# Patient Record
Sex: FEMALE | Race: AMERICAN INDIAN OR ALASKA NATIVE
[De-identification: names, ages, dates, MRNs, and addresses within clinical notes are randomized per-mention and may not be internally consistent; named-entity substitution may affect disease eponyms.]

---

## 2018-10-10 ENCOUNTER — HOSPITAL ENCOUNTER (EMERGENCY)
Dept: HOSPITAL 5 - ED | Age: 53
LOS: 1 days | Discharge: HOME | End: 2018-10-11
Payer: COMMERCIAL

## 2018-10-10 DIAGNOSIS — I10: ICD-10-CM

## 2018-10-10 DIAGNOSIS — R10.84: Primary | ICD-10-CM

## 2018-10-10 LAB
ALBUMIN SERPL-MCNC: 4.4 G/DL (ref 3.9–5)
ALT SERPL-CCNC: 16 UNITS/L (ref 7–56)
ANISOCYTOSIS BLD QL SMEAR: (no result)
BAND NEUTROPHILS # (MANUAL): 0.1 K/MM3
BILIRUB UR QL STRIP: (no result)
BLOOD UR QL VISUAL: (no result)
BUN SERPL-MCNC: 13 MG/DL (ref 7–17)
BUN/CREAT SERPL: 19 %
CALCIUM SERPL-MCNC: 9.1 MG/DL (ref 8.4–10.2)
HCT VFR BLD CALC: 30.3 % (ref 30.3–42.9)
HEMOLYSIS INDEX: 4
HGB BLD-MCNC: 9.5 GM/DL (ref 10.1–14.3)
MCH RBC QN AUTO: 19 PG (ref 28–32)
MCHC RBC AUTO-ENTMCNC: 31 % (ref 30–34)
MCV RBC AUTO: 60 FL (ref 79–97)
MYELOCYTES # (MANUAL): 0 K/MM3
PH UR STRIP: 8 [PH] (ref 5–7)
PLATELET # BLD: 226 K/MM3 (ref 140–440)
PROMYELOCYTES # (MANUAL): 0 K/MM3
PROT UR STRIP-MCNC: (no result) MG/DL
RBC # BLD AUTO: 5.04 M/MM3 (ref 3.65–5.03)
RBC #/AREA URNS HPF: < 1 /HPF (ref 0–6)
TOTAL CELLS COUNTED BLD: 100
UROBILINOGEN UR-MCNC: < 2 MG/DL (ref ?–2)
WBC #/AREA URNS HPF: 1 /HPF (ref 0–6)

## 2018-10-10 PROCEDURE — 81001 URINALYSIS AUTO W/SCOPE: CPT

## 2018-10-10 PROCEDURE — 36415 COLL VENOUS BLD VENIPUNCTURE: CPT

## 2018-10-10 PROCEDURE — 74177 CT ABD & PELVIS W/CONTRAST: CPT

## 2018-10-10 PROCEDURE — 85025 COMPLETE CBC W/AUTO DIFF WBC: CPT

## 2018-10-10 PROCEDURE — 85007 BL SMEAR W/DIFF WBC COUNT: CPT

## 2018-10-10 PROCEDURE — 74019 RADEX ABDOMEN 2 VIEWS: CPT

## 2018-10-10 PROCEDURE — 96374 THER/PROPH/DIAG INJ IV PUSH: CPT

## 2018-10-10 PROCEDURE — 83690 ASSAY OF LIPASE: CPT

## 2018-10-10 PROCEDURE — 99284 EMERGENCY DEPT VISIT MOD MDM: CPT

## 2018-10-10 PROCEDURE — 80053 COMPREHEN METABOLIC PANEL: CPT

## 2018-10-10 NOTE — XRAY REPORT
FINAL REPORT



PROCEDURE:  XR ABDOMEN 2V



TECHNIQUE:  Abdominal series, including supine and upright AP

views. 



HISTORY:  Abd pain 







COMPARISON:  No prior studies are available for comparison.



FINDINGS:  

Bowel gas pattern:Nonobstructive  .



Masses or calcifications:None  .



Bony structures:No significant abnormality  .



Pneumoperitoneum:None .



Other:No significant findings .



IMPRESSION:  

No acute abnormality.

## 2018-10-10 NOTE — CAT SCAN REPORT
FINAL REPORT



PROCEDURE:  CT ABDOMEN PELVIS W CON



TECHNIQUE:  Computerized axial tomography of the abdomen and

pelvis was performed after the IV injection of iodinated nonionic

contrast. 



HISTORY:  abd pain 



COMPARISON:  No prior studies are available for comparison.



FINDINGS:  

Visualized lower thorax: No significant abnormality.



Liver: Normal size and attenuation.



Spleen: Normal size and attenuation.



Gallbladder and biliary system: Normal.



Pancreas: Normal.



Adrenals: Normal.



Kidneys: Both kidneys have normal size. No hydronephrosis. No

renal stones or masses.



GI tract: Stomach is normal. The small bowel has a normal caliber

without obstruction. No ileus or enteritis. The cecum, appendix

region and colon are normal..



Lymph nodes and mesentery: Normal. 



Vasculature: Normal.



Bladder: Normal.



Reproductive organs: Normal.



Peritoneum: No free fluid.



Musculoskeletal structures: No significant abnormality.



Other: None.  



IMPRESSION:  

There is no evidence of intestinal or urinary tract obstruction.

No ileus or enteritis.
General

## 2018-10-11 VITALS — SYSTOLIC BLOOD PRESSURE: 165 MMHG | DIASTOLIC BLOOD PRESSURE: 84 MMHG

## 2020-11-17 ENCOUNTER — HOSPITAL ENCOUNTER (OUTPATIENT)
Dept: HOSPITAL 5 - MAMMO | Age: 55
Discharge: HOME | End: 2020-11-17
Attending: NURSE PRACTITIONER
Payer: COMMERCIAL

## 2020-11-17 DIAGNOSIS — Z12.31: Primary | ICD-10-CM

## 2020-11-17 PROCEDURE — 77067 SCR MAMMO BI INCL CAD: CPT

## 2020-11-17 NOTE — MAMMOGRAPHY REPORT
DIGITAL SCREENING MAMMOGRAM WITH CAD, 11/17/2020



INDICATION: Routine screening mammography. 



TECHNIQUE:  Digital bilateral  2D mammography was obtained in the craniocaudal and mediolateral obliq
ue projections. This examination was interpreted with the benefit of Computer-Aided Detection analysi
s.



COMPARISON: None. This is the patient's first mammogram.



FINDINGS: 



Breast Density: There are scattered areas of fibroglandular density.



There is no evidence of dominant mass, suspicious calcifications or architectural distortion in eithe
r breast.



IMPRESSION:



Follow up recommendation: Routine yearly



BI-RADS Category 1:  Negative.



A "normal" or negative report should not discourage follow up or biopsy of a clinically significant f
inding.



A written summary of these findings will be mailed to the patient. The patient will be entered into a
 mammography reporting system which will generate a reminder letter for the patient's next appointmen
t at the appropriate interval.



The [employer] College [employer] recommends yearly mammograms starting at age 40 and continuing as l
timothy as a woman is in good health.  Breast MRI is recommended for women with an approximate 20-25% or 
greater lifetime risk of breast cancer, including women with a strong family history of breast or ova
sara cancer or who have been treated for Hodgkin's disease.



Signer Name: Irene Jean MD 

Signed: 11/17/2020 5:43 PM

Workstation Name: [email or web address]

## 2021-02-19 ENCOUNTER — HOSPITAL ENCOUNTER (EMERGENCY)
Dept: HOSPITAL 5 - ED | Age: 56
Discharge: HOME | End: 2021-02-19
Payer: COMMERCIAL

## 2021-02-19 VITALS — SYSTOLIC BLOOD PRESSURE: 178 MMHG | DIASTOLIC BLOOD PRESSURE: 99 MMHG

## 2021-02-19 DIAGNOSIS — S46.911A: ICD-10-CM
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
## 2021-02-19 DIAGNOSIS — Z79.899: ICD-10-CM
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
## 2021-02-19 DIAGNOSIS — S39.012A: Primary | ICD-10-CM

## 2021-02-19 PROCEDURE — 72070 X-RAY EXAM THORAC SPINE 2VWS: CPT

## 2021-02-19 PROCEDURE — 72040 X-RAY EXAM NECK SPINE 2-3 VW: CPT

## 2021-02-19 PROCEDURE — 99283 EMERGENCY DEPT VISIT LOW MDM: CPT

## 2021-02-19 PROCEDURE — 72100 X-RAY EXAM L-S SPINE 2/3 VWS: CPT

## 2021-02-19 NOTE — XRAY REPORT
XR spine lumbosacral 2-3V, XR spine cervical 2-3V, XR shoulder 2+V RT, XR spine thoracic 2V



INDICATION / CLINICAL INFORMATION:

back pain.



COMPARISON: 

None available.



FINDINGS:



Right shoulder: No acute fracture.  Normal alignment.  Joint spaces are preserved. No destructive oss
eous lesion or suspicious periosteal reaction.



Spine:

Cervical, thoracic, and lumbar vertebrae: Normal alignment.   Vertebral body heights are preserved th
roughout the spine. C1 and C2 are congruent. Odontoid process is intact.

Spondylosis:No significant abnormality.

Soft tissues: No prevertebral soft tissue thickening in the cervical spine. 



Impression:

1.No acute traumatic abnormality is seen within the right shoulder or the spine.



Signer Name: Edgardo Zhao MD 

Signed: 2/19/2021 11:28 AM

Workstation Name: [id number]

## 2021-02-19 NOTE — XRAY REPORT
XR spine lumbosacral 2-3V, XR spine cervical 2-3V, XR shoulder 2+V RT, XR spine thoracic 2V



INDICATION / CLINICAL INFORMATION:

back pain.



COMPARISON: 

None available.



FINDINGS:



Right shoulder: No acute fracture.  Normal alignment.  Joint spaces are preserved. No destructive oss
eous lesion or suspicious periosteal reaction.



Spine:

Cervical, thoracic, and lumbar vertebrae: Normal alignment.   Vertebral body heights are preserved th
roughout the spine. C1 and C2 are congruent. Odontoid process is intact.

Spondylosis:No significant abnormality.

Soft tissues: No prevertebral soft tissue thickening in the cervical spine. 



Impression:

1.No acute traumatic abnormality is seen within the right shoulder or the spine.



Signer Name: Edgardo Zhao MD 

Signed: 2/19/2021 11:28 AM

Workstation Name: [id number]

## 2021-02-19 NOTE — XRAY REPORT
XR spine lumbosacral 2-3V, XR spine cervical 2-3V, XR shoulder 2+V RT, XR spine thoracic 2V



INDICATION / CLINICAL INFORMATION:

back pain.



COMPARISON: 

None available.



FINDINGS:



Right shoulder: No acute fracture.  Normal alignment.  Joint spaces are preserved. No destructive oss
eous lesion or suspicious periosteal reaction.



Spine:

Cervical, thoracic, and lumbar vertebrae: Normal alignment.   Vertebral body heights are preserved th
roughout the spine. C1 and C2 are congruent. Odontoid process is intact.

Spondylosis:No significant abnormality.

Soft tissues: No prevertebral soft tissue thickening in the cervical spine. 



Impression:

1.No acute traumatic abnormality is seen within the right shoulder or the spine.



Signer Name: Edgardo Zhao MD 

Signed: 2/19/2021 11:28 AM

Workstation Name: [id number]
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
## 2021-02-19 NOTE — XRAY REPORT
XR spine lumbosacral 2-3V, XR spine cervical 2-3V, XR shoulder 2+V RT, XR spine thoracic 2V



INDICATION / CLINICAL INFORMATION:

back pain.



COMPARISON: 

None available.



FINDINGS:



Right shoulder: No acute fracture.  Normal alignment.  Joint spaces are preserved. No destructive oss
eous lesion or suspicious periosteal reaction.



Spine:

Cervical, thoracic, and lumbar vertebrae: Normal alignment.   Vertebral body heights are preserved th
roughout the spine. C1 and C2 are congruent. Odontoid process is intact.

Spondylosis:No significant abnormality.

Soft tissues: No prevertebral soft tissue thickening in the cervical spine. 



Impression:

1.No acute traumatic abnormality is seen within the right shoulder or the spine.



Signer Name: Edgardo Zhao MD 

Signed: 2/19/2021 11:28 AM

Workstation Name: [id number]

## 2022-02-25 NOTE — EMERGENCY DEPARTMENT REPORT
- General


Chief complaint: Pain General


Stated complaint: BREAST PAIN


Time Seen by Provider: 22 13:19


Source: patient


Mode of arrival: Ambulatory


Limitations: No Limitations





- History of Present Illness


Initial comments: 





56-year-old  female presents to the emergency room for right breast pain 

x1 week.  She has not taken anything for pain as she states is just a little.  

She reports she does not have a primary care provider at this time.  She denies 

any injury to her breasts.  Denies any nipple drainage from her breasts no fever

no chills.


MD complaint: rash


Onset/Timin


-: week(s)


Location: chest (Right breast)


Severity: mild


Severity scale (0 -10): 2


Quality: burning


Consistency: intermittent


Improves with: none


Worsens with: none


Context: none


Associated symptoms: denies other symptoms


Treatments Prior to Arrival: none





- Related Data


                                  Previous Rx's











 Medication  Instructions  Recorded  Last Taken  Type


 


HYDROcodone/APAP 5-325 [[place] 1 each PO Q6HR PRN #10 tablet 10/11/18 Unknown Rx





5/325]    


 


Cyclobenzaprine HCl [Flexeril 5 MG 10 mg PO TID #30 tab 21 Unknown Rx





TAB]    


 


Naproxen [Naprosyn] 500 mg PO BID #20 tablet 21 Unknown Rx


 


Clotrimazole/Betamethasone Dip 15 gm TP BID #1 tube 22 Unknown Rx





[Lotrisone Cream]    











                                    Allergies











Allergy/AdvReac Type Severity Reaction Status Date / Time


 


No Known Allergies Allergy   Unverified 22 12:29














Abscess Boil HPI





- HPI


Chief Complaint: Pain General


Stated Complaint: BREAST PAIN


Time Seen by Provider: 22 13:19


Home Medications: 


                                  Previous Rx's











 Medication  Instructions  Recorded  Last Taken  Type


 


HYDROcodone/APAP 5-325 [[place] 1 each PO Q6HR PRN #10 tablet 10/11/18 Unknown Rx





5/325]    


 


Cyclobenzaprine HCl [Flexeril 5 MG 10 mg PO TID #30 tab 21 Unknown Rx





TAB]    


 


Naproxen [Naprosyn] 500 mg PO BID #20 tablet 21 Unknown Rx


 


Clotrimazole/Betamethasone Dip 15 gm TP BID #1 tube 22 Unknown Rx





[Lotrisone Cream]    











Allergies/Adverse Reactions: 


                                    Allergies











Allergy/AdvReac Type Severity Reaction Status Date / Time


 


No Known Allergies Allergy   Unverified 22 12:29














ED Review of Systems


ROS: 


Stated complaint: BREAST PAIN


Other details as noted in HPI





Comment: All other systems reviewed and negative





ED Past Medical Hx





- Past Medical History


Previous Medical History?: No





- Surgical History


Past Surgical History?: Yes


Additional Surgical History: 





- Social History


Smoking Status: Never Smoker


Substance Use Type: None





- Medications


Home Medications: 


                                Home Medications











 Medication  Instructions  Recorded  Confirmed  Last Taken  Type


 


HYDROcodone/APAP 5-325 [[place] 1 each PO Q6HR PRN #10 tablet 10/11/18  Unknown Rx





5/325]     


 


Cyclobenzaprine HCl [Flexeril 5 MG 10 mg PO TID #30 tab 21  Unknown Rx





TAB]     


 


Naproxen [Naprosyn] 500 mg PO BID #20 tablet 21  Unknown Rx


 


Clotrimazole/Betamethasone Dip 15 gm TP BID #1 tube 22  Unknown Rx





[Lotrisone Cream]     














ED Physical Exam





- General


Limitations: No Limitations


General appearance: alert, in no apparent distress





- Head


Head exam: Present: atraumatic, normocephalic





- Eye


Eye exam: Present: normal appearance





- ENT


ENT exam: Present: mucous membranes moist





- Neck


Neck exam: Present: normal inspection





- Respiratory


Respiratory exam: Present: normal lung sounds bilaterally.  Absent: respiratory 

distress





- Cardiovascular


Cardiovascular Exam: Present: regular rate, normal rhythm.  Absent: systolic 

murmur, diastolic murmur, rubs, gallop





- GI/Abdominal


GI/Abdominal exam: Present: soft, normal bowel sounds





- Extremities Exam


Extremities exam: Present: normal inspection





- Back Exam


Back exam: Present: normal inspection





- Neurological Exam


Neurological exam: Present: alert, oriented X3





- Psychiatric


Psychiatric exam: Present: normal affect, normal mood





- Skin


Skin exam: Present: warm, dry, intact, rash, erythema (under rt breast)





ED Course





                                   Vital Signs











  22





  12:27


 


Temperature 98.0 F


 


Pulse Rate 82


 


Respiratory 18





Rate 


 


Blood Pressure 164/86


 


O2 Sat by Pulse 99





Oximetry 














ED Medical Decision Making





- Medical Decision Making





56-year-old  female presents to the emergency room for right breast pain 

x1 week.  She has not taken anything for pain as she states is just a little.  

She reports she does not have a primary care provider at this time.  She denies 

any injury to her breasts.  Denies any nipple drainage from her breasts no fever

no chills.








Discussed with patient that I do not feel it is a cellulitis.  Discussed with 

patient she can use over-the-counter Lotrisone or antifungal and to follow-up 

with her primary care provider.  I encourage patient to get a mammogram if she 

has not had 1 this year.  Tylenol ibuprofen as needed for pain management.


Critical care attestation.: 


If time is entered above; I have spent that time in minutes in the direct care 

of this critically ill patient, excluding procedure time.








ED Disposition


Clinical Impression: 


 Tinea, Breast pain, right





Disposition:  HOME / SELF CARE / HOMELESS


Is pt being admited?: No


Does the pt Need Aspirin: No


Condition: Stable


Instructions:  Nonspecific Chest Pain, Adult, Pain Without a Known Cause


Additional Instructions: 


Use cream as prescribed follow-up with your primary care provider


Prescriptions: 


Clotrimazole/Betamethasone Dip [Lotrisone Cream] 15 gm TP BID #1 tube


Referrals: 


ELA JAMESON MD [Primary Care Provider] - 3-5 Days


ELIEZER PAULINO MD [Staff Physician] - 3-5 Days


LAI WRIGHT MD [Staff Physician] - 3-5 Days


LENIN LEVINE FNP [Referring] - 3-5 Days


Time of Disposition: 13:30